# Patient Record
Sex: FEMALE | Race: WHITE | NOT HISPANIC OR LATINO
[De-identification: names, ages, dates, MRNs, and addresses within clinical notes are randomized per-mention and may not be internally consistent; named-entity substitution may affect disease eponyms.]

---

## 2022-11-17 ENCOUNTER — NON-APPOINTMENT (OUTPATIENT)
Age: 72
End: 2022-11-17

## 2022-11-17 ENCOUNTER — APPOINTMENT (OUTPATIENT)
Dept: HEART AND VASCULAR | Facility: CLINIC | Age: 72
End: 2022-11-17

## 2022-11-17 VITALS
WEIGHT: 257 LBS | HEART RATE: 90 BPM | RESPIRATION RATE: 16 BRPM | HEIGHT: 66 IN | DIASTOLIC BLOOD PRESSURE: 68 MMHG | BODY MASS INDEX: 41.3 KG/M2 | SYSTOLIC BLOOD PRESSURE: 150 MMHG | TEMPERATURE: 98 F | OXYGEN SATURATION: 98 %

## 2022-11-17 DIAGNOSIS — Z87.09 PERSONAL HISTORY OF OTHER DISEASES OF THE RESPIRATORY SYSTEM: ICD-10-CM

## 2022-11-17 DIAGNOSIS — Z78.9 OTHER SPECIFIED HEALTH STATUS: ICD-10-CM

## 2022-11-17 DIAGNOSIS — Z82.49 FAMILY HISTORY OF ISCHEMIC HEART DISEASE AND OTHER DISEASES OF THE CIRCULATORY SYSTEM: ICD-10-CM

## 2022-11-17 DIAGNOSIS — Z85.89 PERSONAL HISTORY OF MALIGNANT NEOPLASM OF OTHER ORGANS AND SYSTEMS: ICD-10-CM

## 2022-11-17 DIAGNOSIS — Z86.19 PERSONAL HISTORY OF OTHER INFECTIOUS AND PARASITIC DISEASES: ICD-10-CM

## 2022-11-17 DIAGNOSIS — Z86.39 PERSONAL HISTORY OF OTHER ENDOCRINE, NUTRITIONAL AND METABOLIC DISEASE: ICD-10-CM

## 2022-11-17 DIAGNOSIS — Z72.3 LACK OF PHYSICAL EXERCISE: ICD-10-CM

## 2022-11-17 DIAGNOSIS — Z82.3 FAMILY HISTORY OF STROKE: ICD-10-CM

## 2022-11-17 DIAGNOSIS — Z86.010 PERSONAL HISTORY OF COLONIC POLYPS: ICD-10-CM

## 2022-11-17 DIAGNOSIS — Z85.820 PERSONAL HISTORY OF MALIGNANT MELANOMA OF SKIN: ICD-10-CM

## 2022-11-17 DIAGNOSIS — N20.0 CALCULUS OF KIDNEY: ICD-10-CM

## 2022-11-17 DIAGNOSIS — Z87.891 PERSONAL HISTORY OF NICOTINE DEPENDENCE: ICD-10-CM

## 2022-11-17 PROCEDURE — 99205 OFFICE O/P NEW HI 60 MIN: CPT

## 2022-11-17 PROCEDURE — 93000 ELECTROCARDIOGRAM COMPLETE: CPT

## 2022-11-17 RX ORDER — SIMVASTATIN 40 MG/1
40 TABLET, FILM COATED ORAL DAILY
Refills: 0 | Status: ACTIVE | COMMUNITY

## 2022-11-17 RX ORDER — LEVOTHYROXINE SODIUM 100 UG/1
100 CAPSULE ORAL DAILY
Refills: 0 | Status: ACTIVE | COMMUNITY

## 2022-11-17 NOTE — HISTORY OF PRESENT ILLNESS
[FreeTextEntry1] : Jerri Rey is a 71 yo female who presents for CV evaluation.\par \par She denies cp, sob, pnd, orthopnea, edema, palp, or loc.\par \par She is active.  She is compliant with meds.\par \par ECG today reveals NSR, ST T changes.\par \par Clinical hx reviewed in detail.\par \par Recommendations:\par 1. exercise\par 2. continue current meds\par 3. EXSE\par 4. CPET\par 5. carotid duplex

## 2022-11-17 NOTE — DISCUSSION/SUMMARY
[ECG Normal Variant] : ECG normal variant [Myocardial Ischemia] : myocardial ischemia [Hyperlipidemia] : hyperlipidemia [Stable] : stable [None] : There are no changes in medication management [Diet Modification] : diet modification [Exercise] : exercise [Weight Loss] : weight loss [Patient] : the patient [FreeTextEntry1] : DM \par \par GIOVANNI [EKG obtained to assist in diagnosis and management of assessed problem(s)] : EKG obtained to assist in diagnosis and management of assessed problem(s)

## 2022-11-17 NOTE — REASON FOR VISIT
[Arrhythmia/ECG Abnorrmalities] : arrhythmia/ECG abnormalities [Hyperlipidemia] : hyperlipidemia [FreeTextEntry3] : Jamie Feng

## 2022-12-19 ENCOUNTER — APPOINTMENT (OUTPATIENT)
Dept: HEART AND VASCULAR | Facility: CLINIC | Age: 72
End: 2022-12-19

## 2022-12-19 VITALS
BODY MASS INDEX: 40.02 KG/M2 | DIASTOLIC BLOOD PRESSURE: 60 MMHG | SYSTOLIC BLOOD PRESSURE: 154 MMHG | HEART RATE: 77 BPM | HEIGHT: 66 IN | WEIGHT: 249 LBS | OXYGEN SATURATION: 96 %

## 2022-12-19 PROCEDURE — 93880 EXTRACRANIAL BILAT STUDY: CPT

## 2022-12-19 PROCEDURE — 93320 DOPPLER ECHO COMPLETE: CPT

## 2022-12-19 PROCEDURE — 93325 DOPPLER ECHO COLOR FLOW MAPG: CPT

## 2022-12-19 PROCEDURE — 93351 STRESS TTE COMPLETE: CPT

## 2022-12-21 ENCOUNTER — NON-APPOINTMENT (OUTPATIENT)
Age: 72
End: 2022-12-21

## 2022-12-21 ENCOUNTER — APPOINTMENT (OUTPATIENT)
Dept: VASCULAR SURGERY | Facility: CLINIC | Age: 72
End: 2022-12-21

## 2022-12-21 VITALS — HEIGHT: 66 IN | WEIGHT: 249 LBS | BODY MASS INDEX: 40.02 KG/M2

## 2022-12-21 PROCEDURE — 99204 OFFICE O/P NEW MOD 45 MIN: CPT

## 2022-12-21 NOTE — HISTORY OF PRESENT ILLNESS
[FreeTextEntry1] : 71 yo female referred by Dr. Leigh for a high grade asymptomatic carotid stenosis. The patient denies amaurosis fugax, motor or sensory deficits. She is a former smoker quit 48 years ago. She currently takes simvastatin and denies being on any antiplatelets or blood thinners.\par

## 2022-12-21 NOTE — REVIEW OF SYSTEMS
[As Noted in HPI] : as noted in HPI [Fever] : no fever [Chills] : no chills [Lower Ext Edema] : no lower extremity edema

## 2022-12-21 NOTE — CONSULT LETTER
[Dear  ___] : Dear  [unfilled], [Consult Letter:] : I had the pleasure of evaluating your patient, [unfilled]. [Please see my note below.] : Please see my note below. [Consult Closing:] : Thank you very much for allowing me to participate in the care of this patient.  If you have any questions, please do not hesitate to contact me. [Sincerely,] : Sincerely, [FreeTextEntry2] : Jamie Not [FreeTextEntry3] : Claudio Moreno MD, RPVI\par Chief, Vascular Surgery\par

## 2022-12-21 NOTE — ASSESSMENT
[FreeTextEntry1] : 73 yo female with a high grade asymptomatic carotid stenosis.  Her ICA : CCA ratio is 6. I explained to the patient that asymptomatic carotid stenosis is treated when the stenosis is > 80%. An ICA : CCA ration of 6 corresponds to a stenosis > 80%. I recommended that she undergo a CTA to confirm the degree of the stenosis. I also recommended that she begin to take an Aspirin 81 mg daily. \par \par She will be seen on 1/6 for follow up .

## 2022-12-21 NOTE — PHYSICAL EXAM
[Carotid Bruits] : carotid bruit  [Normal Breath Sounds] : Normal breath sounds [Normal Rate and Rhythm] : normal rate and rhythm [2+] : left 2+ [No Rash or Lesion] : No rash or lesion [Alert] : alert [Oriented to Person] : oriented to person [Oriented to Place] : oriented to place [Oriented to Time] : oriented to time [de-identified] : Awake and Alert [de-identified] : No gross motor or sensory deficits  [de-identified] : Appropriate

## 2023-01-06 ENCOUNTER — APPOINTMENT (OUTPATIENT)
Dept: VASCULAR SURGERY | Facility: CLINIC | Age: 73
End: 2023-01-06
Payer: MEDICARE

## 2023-01-06 VITALS
SYSTOLIC BLOOD PRESSURE: 144 MMHG | HEART RATE: 82 BPM | HEIGHT: 66 IN | OXYGEN SATURATION: 98 % | RESPIRATION RATE: 16 BRPM | WEIGHT: 250 LBS | DIASTOLIC BLOOD PRESSURE: 78 MMHG | BODY MASS INDEX: 40.18 KG/M2 | TEMPERATURE: 97.8 F

## 2023-01-06 PROCEDURE — 99215 OFFICE O/P EST HI 40 MIN: CPT

## 2023-01-06 NOTE — CONSULT LETTER
[Dear  ___] : Dear  [unfilled], [Consult Letter:] : I had the pleasure of evaluating your patient, [unfilled]. [Please see my note below.] : Please see my note below. [Consult Closing:] : Thank you very much for allowing me to participate in the care of this patient.  If you have any questions, please do not hesitate to contact me. [Sincerely,] : Sincerely, [FreeTextEntry2] : Jamie Feng [FreeTextEntry3] : Claudio Moreno MD, RPVI\par Chief, Vascular Surgery\par

## 2023-01-06 NOTE — HISTORY OF PRESENT ILLNESS
[FreeTextEntry1] : 73 yo female referred by Dr. Leigh for a high grade asymptomatic carotid stenosis. The patient denies amaurosis fugax, motor or sensory deficits. She is a former smoker quit 48 years ago. She currently takes simvastatin and denies being on any antiplatelets or blood thinners.\par  [de-identified] : 72-year-old female with a high-grade right carotid stenosis returns in follow-up.  The patient continues to deny amaurosis fugax, motor or sensory deficits.  The patient continues to take aspirin and a statin as prescribed.  She underwent a CTA and is here to discuss the results and additional treatment options.

## 2023-01-06 NOTE — ASSESSMENT
[FreeTextEntry1] : 73 yo female with a high grade right asymptomatic carotid stenosis.  Her ICA : CCA ratio is 6. She underwent a CTA which demonstrated a 90% right carotid stenosis corroborating the duplex findings.  I recommended that the patient undergo a right carotid endarterectomy.  The risks and benefits including infection bleeding stroke were discussed with the patient in detail.  The patient is planning on going to [place] for the next several months and is uncertain on when she would like to proceed.  I recommended that she proceed with the surgery before she goes to [place].  She would like to discuss my recommendations with her .  She will contact me if she would like to proceed.\par \par \par \par 45 min

## 2023-01-06 NOTE — PHYSICAL EXAM
[Normal Breath Sounds] : Normal breath sounds [Normal Rate and Rhythm] : normal rate and rhythm [2+] : left 2+ [No Rash or Lesion] : No rash or lesion [Alert] : alert [Oriented to Person] : oriented to person [Oriented to Place] : oriented to place [Oriented to Time] : oriented to time [de-identified] : Awake and Alert [de-identified] : No gross motor or sensory deficits  [de-identified] : Appropriate

## 2023-01-06 NOTE — REVIEW OF SYSTEMS
[Fever] : no fever [Chills] : no chills [Lower Ext Edema] : no lower extremity edema [As Noted in HPI] : as noted in HPI

## 2023-02-23 ENCOUNTER — APPOINTMENT (OUTPATIENT)
Dept: HEART AND VASCULAR | Facility: CLINIC | Age: 73
End: 2023-02-23
Payer: MEDICARE

## 2023-02-23 VITALS
TEMPERATURE: 97.6 F | WEIGHT: 253 LBS | HEIGHT: 66 IN | OXYGEN SATURATION: 98 % | DIASTOLIC BLOOD PRESSURE: 76 MMHG | BODY MASS INDEX: 40.66 KG/M2 | RESPIRATION RATE: 17 BRPM | HEART RATE: 78 BPM | SYSTOLIC BLOOD PRESSURE: 150 MMHG

## 2023-02-23 DIAGNOSIS — Z00.00 ENCOUNTER FOR GENERAL ADULT MEDICAL EXAMINATION W/OUT ABNORMAL FINDINGS: ICD-10-CM

## 2023-02-23 PROCEDURE — 99215 OFFICE O/P EST HI 40 MIN: CPT

## 2023-02-24 LAB
ALBUMIN SERPL ELPH-MCNC: 4.6 G/DL
ALP BLD-CCNC: 102 U/L
ALT SERPL-CCNC: 27 U/L
ANION GAP SERPL CALC-SCNC: 17 MMOL/L
AST SERPL-CCNC: 17 U/L
BILIRUB SERPL-MCNC: 0.6 MG/DL
BUN SERPL-MCNC: 15 MG/DL
CALCIUM SERPL-MCNC: 10.8 MG/DL
CHLORIDE SERPL-SCNC: 100 MMOL/L
CO2 SERPL-SCNC: 23 MMOL/L
CREAT SERPL-MCNC: 0.61 MG/DL
EGFR: 93 ML/MIN/1.73M2
GLUCOSE SERPL-MCNC: 242 MG/DL
POTASSIUM SERPL-SCNC: 5 MMOL/L
PROT SERPL-MCNC: 7.5 G/DL
SODIUM SERPL-SCNC: 140 MMOL/L

## 2023-02-24 RX ORDER — ASPIRIN 81 MG
81 TABLET, DELAYED RELEASE (ENTERIC COATED) ORAL
Refills: 0 | Status: ACTIVE | COMMUNITY

## 2023-02-24 RX ORDER — UBIDECARENONE/VIT E ACET 100MG-5
CAPSULE ORAL
Refills: 0 | Status: ACTIVE | COMMUNITY

## 2023-02-24 RX ORDER — TURMERIC ROOT EXTRACT 500 MG
TABLET ORAL
Refills: 0 | Status: ACTIVE | COMMUNITY

## 2023-02-24 RX ORDER — VITAMIN E ACID SUCCINATE 268 MG
TABLET ORAL
Refills: 0 | Status: ACTIVE | COMMUNITY

## 2023-02-24 RX ORDER — GLIPIZIDE 10 MG/1
10 TABLET ORAL DAILY
Refills: 0 | Status: ACTIVE | COMMUNITY

## 2023-02-24 RX ORDER — SAW PALMETTO 160 MG
500 CAPSULE ORAL
Refills: 0 | Status: ACTIVE | COMMUNITY

## 2023-02-24 RX ORDER — UBIDECARENONE 200 MG
CAPSULE ORAL
Refills: 0 | Status: ACTIVE | COMMUNITY

## 2023-02-26 ENCOUNTER — RESULT REVIEW (OUTPATIENT)
Age: 73
End: 2023-02-26

## 2023-02-26 NOTE — DISCUSSION/SUMMARY
[ECG Normal Variant] : ECG normal variant [Myocardial Ischemia] : myocardial ischemia [Sodium Restriction] : sodium restriction [Possible Cardiac Ischemia (Intermd Prob)] : possible cardiac ischemia (intermediate probability) [Dietary Modification] : dietary modification [Exercise Regimen] : an exercise regimen [Weight Reduction] : weight reduction [Hyperlipidemia] : hyperlipidemia [Stable] : stable [Diet Modification] : diet modification [Exercise] : exercise [Weight Loss] : weight loss [Peripheral Vascular Disease] : peripheral vascular disease [Deteriorating] : deteriorating [None] : There are no changes in medication management [Smoking Cessation] : smoking cessation [Exercise Rehab] : exercise rehabilitation [Patient] : the patient [de-identified] : severe CAROLYN lesion; mod P2 lesion by CTA 12/2022; abnl carotid dupelx 12/2022 [de-identified] : abnl stress ECG 12/2022 [FreeTextEntry1] : DM \par \par GIOVANNI

## 2023-02-26 NOTE — REASON FOR VISIT
[Arrhythmia/ECG Abnorrmalities] : arrhythmia/ECG abnormalities [Hyperlipidemia] : hyperlipidemia [Coronary Artery Disease] : coronary artery disease [Carotid, Aortic and Peripheral Vascular Disease] : carotid, aortic and peripheral vascular disease [Spouse] : spouse [FreeTextEntry3] : Jamie Feng

## 2023-02-26 NOTE — HISTORY OF PRESENT ILLNESS
[FreeTextEntry1] : Jerri Rey returns for follow up.   \par \par She denies cp, sob, pnd, orthopnea, edema, palp, or loc.\par \par She is active.  She is compliant with meds.\par \par Carotid Duplex 12/2022: severe CAROLYN lesion\par EXSE 12/2022: nl lv sys fxn; nl richard fxn; LVH; 3:00 min Andrea; pos ECG; SVT, PVC; no ischemia by WM\par CTA 12/2022: severe CAROLYN lesion; mod lesion in P2 segment of post cerebral artery; poss meningioma\par \par Clinical hx and results reviewed in detail.\par \par Recommendations:\par 1. exercise\par 2. continue current meds\par 3. Vasc surgery eval/plan noted\par 4. coronary CTA as part of pre-op eval\par

## 2023-02-26 NOTE — PHYSICAL EXAM
[Well Developed] : well developed [Well Nourished] : well nourished [No Acute Distress] : no acute distress [Normal Conjunctiva] : normal conjunctiva [Normal Venous Pressure] : normal venous pressure [No Carotid Bruit] : no carotid bruit [Normal S1, S2] : normal S1, S2 [No Rub] : no rub [No Murmur] : no murmur [No Gallop] : no gallop [Clear Lung Fields] : clear lung fields [Good Air Entry] : good air entry [No Respiratory Distress] : no respiratory distress  [Soft] : abdomen soft [Non Tender] : non-tender [No Masses/organomegaly] : no masses/organomegaly [Normal Bowel Sounds] : normal bowel sounds [Normal Gait] : normal gait [No Edema] : no edema [No Cyanosis] : no cyanosis [No Clubbing] : no clubbing [No Rash] : no rash [No Varicosities] : no varicosities [No Skin Lesions] : no skin lesions [Moves all extremities] : moves all extremities [No Focal Deficits] : no focal deficits [Normal Speech] : normal speech [Alert and Oriented] : alert and oriented [Normal memory] : normal memory

## 2023-03-01 ENCOUNTER — NON-APPOINTMENT (OUTPATIENT)
Age: 73
End: 2023-03-01

## 2023-03-01 ENCOUNTER — APPOINTMENT (OUTPATIENT)
Dept: HEART AND VASCULAR | Facility: CLINIC | Age: 73
End: 2023-03-01

## 2023-03-01 ENCOUNTER — RESULT REVIEW (OUTPATIENT)
Age: 73
End: 2023-03-01

## 2023-03-02 ENCOUNTER — APPOINTMENT (OUTPATIENT)
Dept: VASCULAR SURGERY | Facility: HOSPITAL | Age: 73
End: 2023-03-02

## 2023-03-06 ENCOUNTER — RESULT REVIEW (OUTPATIENT)
Age: 73
End: 2023-03-06

## 2023-03-06 ENCOUNTER — NON-APPOINTMENT (OUTPATIENT)
Age: 73
End: 2023-03-06

## 2023-03-07 ENCOUNTER — APPOINTMENT (OUTPATIENT)
Dept: VASCULAR SURGERY | Facility: HOSPITAL | Age: 73
End: 2023-03-07

## 2023-03-08 ENCOUNTER — TRANSCRIPTION ENCOUNTER (OUTPATIENT)
Age: 73
End: 2023-03-08

## 2023-03-09 ENCOUNTER — TRANSCRIPTION ENCOUNTER (OUTPATIENT)
Age: 73
End: 2023-03-09

## 2023-03-16 ENCOUNTER — APPOINTMENT (OUTPATIENT)
Dept: HEART AND VASCULAR | Facility: CLINIC | Age: 73
End: 2023-03-16
Payer: MEDICARE

## 2023-03-16 VITALS
DIASTOLIC BLOOD PRESSURE: 76 MMHG | BODY MASS INDEX: 39.37 KG/M2 | HEIGHT: 66 IN | RESPIRATION RATE: 16 BRPM | OXYGEN SATURATION: 97 % | TEMPERATURE: 97.6 F | WEIGHT: 245 LBS | HEART RATE: 103 BPM | SYSTOLIC BLOOD PRESSURE: 130 MMHG

## 2023-03-16 DIAGNOSIS — R94.39 ABNORMAL RESULT OF OTHER CARDIOVASCULAR FUNCTION STUDY: ICD-10-CM

## 2023-03-16 PROCEDURE — 99215 OFFICE O/P EST HI 40 MIN: CPT

## 2023-03-16 RX ORDER — METOPROLOL SUCCINATE 100 MG/1
100 TABLET, EXTENDED RELEASE ORAL
Qty: 2 | Refills: 0 | Status: DISCONTINUED | COMMUNITY
Start: 2023-02-24 | End: 2023-03-16

## 2023-03-16 NOTE — DISCUSSION/SUMMARY
[ECG Normal Variant] : ECG normal variant [Myocardial Ischemia] : myocardial ischemia [Sodium Restriction] : sodium restriction [Coronary Artery Disease] : coronary artery disease [Possible Cardiac Ischemia (Intermd Prob)] : possible cardiac ischemia (intermediate probability) [Dietary Modification] : dietary modification [Exercise Regimen] : an exercise regimen [Weight Reduction] : weight reduction [Hyperlipidemia] : hyperlipidemia [Stable] : stable [Diet Modification] : diet modification [Exercise] : exercise [Weight Loss] : weight loss [Peripheral Vascular Disease] : peripheral vascular disease [Deteriorating] : deteriorating [Smoking Cessation] : smoking cessation [None] : There are no changes in medication management [Exercise Rehab] : exercise rehabilitation [Patient] : the patient [de-identified] : nonobstructive CAD by cardiac cath 3/2023; abnl coronary CTA 2/2023; abnl stress ECG 12/2022 [de-identified] : severe CAROLYN lesion; mod P2 lesion by CTA 12/2022; abnl carotid dupelx 12/2022 [FreeTextEntry1] : DM \par \par GIOVANNI

## 2023-03-16 NOTE — HISTORY OF PRESENT ILLNESS
[FreeTextEntry1] : Jerri Rey returns for follow up.   \par \par She denies cp, sob, pnd, orthopnea, edema, palp, or loc.\par \par She is active.  She is compliant with meds.\par \par Carotid Duplex 12/2022: severe CAROLYN lesion\par EXSE 12/2022: nl lv sys fxn; nl richard fxn; LVH; 3:00 min Andrea; pos ECG; SVT, PVC; no ischemia by WM\par CTA 12/2022: severe CAROLYN lesion; mod lesion in P2 segment of post cerebral artery; poss meningioma\par coronary CTA 2/2023: mod mid LAD lesion (CT FFR 0.88-0.77), poss severe D1 lesion, mild CX (CT FFR 0.93), and mod RCA lesion (CT FFR 0.86)\par Cardiac Cath 3/2023: non obstructive CAD\par \par Recovery has been unremarkable.   R CEA surgery has been delayed due to UTI - being treated now.\par \par Clinical hx and results reviewed in detail.\par \par Recommendations:\par 1. exercise\par 2. continue current meds\par 3. repeat UA today\par 4. Vasc surgery follow up - ok to proceed from CV perspective\par 5. Endocrine referral \par 6. f/u with me post R CEA\par \par \par

## 2023-03-16 NOTE — REASON FOR VISIT
[Arrhythmia/ECG Abnorrmalities] : arrhythmia/ECG abnormalities [Hyperlipidemia] : hyperlipidemia [Coronary Artery Disease] : coronary artery disease [Carotid, Aortic and Peripheral Vascular Disease] : carotid, aortic and peripheral vascular disease [FreeTextEntry3] : Jamie Feng

## 2023-03-17 ENCOUNTER — NON-APPOINTMENT (OUTPATIENT)
Age: 73
End: 2023-03-17

## 2023-03-17 LAB
APPEARANCE: CLEAR
BACTERIA: ABNORMAL
BILIRUBIN URINE: NEGATIVE
BLOOD URINE: NEGATIVE
COLOR: YELLOW
GLUCOSE QUALITATIVE U: ABNORMAL
HYALINE CASTS: 0 /LPF
KETONES URINE: ABNORMAL
LEUKOCYTE ESTERASE URINE: NEGATIVE
MICROSCOPIC-UA: NORMAL
NITRITE URINE: NEGATIVE
PH URINE: 6
PROTEIN URINE: NORMAL
RED BLOOD CELLS URINE: 4 /HPF
SPECIFIC GRAVITY URINE: 1.04
SQUAMOUS EPITHELIAL CELLS: 6 /HPF
URIC ACID CRYSTALS: ABNORMAL
UROBILINOGEN URINE: NORMAL
WHITE BLOOD CELLS URINE: 2 /HPF

## 2023-03-21 ENCOUNTER — APPOINTMENT (OUTPATIENT)
Dept: ENDOCRINOLOGY | Facility: CLINIC | Age: 73
End: 2023-03-21
Payer: MEDICARE

## 2023-03-21 VITALS
WEIGHT: 245 LBS | BODY MASS INDEX: 39.37 KG/M2 | HEART RATE: 75 BPM | OXYGEN SATURATION: 98 % | SYSTOLIC BLOOD PRESSURE: 130 MMHG | HEIGHT: 66 IN | DIASTOLIC BLOOD PRESSURE: 80 MMHG

## 2023-03-21 LAB — GLUCOSE BLDC GLUCOMTR-MCNC: 139

## 2023-03-21 PROCEDURE — 82962 GLUCOSE BLOOD TEST: CPT

## 2023-03-21 PROCEDURE — G0444 DEPRESSION SCREEN ANNUAL: CPT

## 2023-03-21 PROCEDURE — 99205 OFFICE O/P NEW HI 60 MIN: CPT | Mod: 25

## 2023-03-21 RX ORDER — ZINC 25 MG
25 TABLET ORAL
Refills: 0 | Status: DISCONTINUED | COMMUNITY
End: 2023-03-21

## 2023-03-23 RX ORDER — ORAL SEMAGLUTIDE 14 MG/1
14 TABLET ORAL
Qty: 30 | Refills: 0 | Status: DISCONTINUED | COMMUNITY
Start: 2022-08-19 | End: 2023-03-23

## 2023-03-23 NOTE — ASSESSMENT
[0] : 2) Feeling down, depressed, or hopeless: Not at all (0) [PHQ-2 Negative - No further assessment needed] : PHQ-2 Negative - No further assessment needed [[id number]] : 0

## 2023-03-24 ENCOUNTER — NON-APPOINTMENT (OUTPATIENT)
Age: 73
End: 2023-03-24

## 2023-03-31 RX ORDER — ORAL SEMAGLUTIDE 14 MG/1
14 TABLET ORAL
Qty: 90 | Refills: 0 | Status: ACTIVE | COMMUNITY

## 2023-04-10 ENCOUNTER — APPOINTMENT (OUTPATIENT)
Dept: ENDOCRINOLOGY | Facility: CLINIC | Age: 73
End: 2023-04-10
Payer: MEDICARE

## 2023-04-10 PROCEDURE — 95251 CONT GLUC MNTR ANALYSIS I&R: CPT

## 2023-04-10 PROCEDURE — 95250 CONT GLUC MNTR PHYS/QHP EQP: CPT

## 2023-04-24 ENCOUNTER — APPOINTMENT (OUTPATIENT)
Dept: HEART AND VASCULAR | Facility: CLINIC | Age: 73
End: 2023-04-24
Payer: MEDICARE

## 2023-04-24 ENCOUNTER — RESULT REVIEW (OUTPATIENT)
Age: 73
End: 2023-04-24

## 2023-04-24 VITALS
WEIGHT: 244 LBS | TEMPERATURE: 97.6 F | SYSTOLIC BLOOD PRESSURE: 130 MMHG | BODY MASS INDEX: 39.21 KG/M2 | RESPIRATION RATE: 16 BRPM | OXYGEN SATURATION: 96 % | HEART RATE: 76 BPM | DIASTOLIC BLOOD PRESSURE: 66 MMHG | HEIGHT: 66 IN

## 2023-04-24 PROCEDURE — 99215 OFFICE O/P EST HI 40 MIN: CPT

## 2023-04-24 RX ORDER — TIRZEPATIDE 5 MG/.5ML
5 INJECTION, SOLUTION SUBCUTANEOUS
Qty: 1 | Refills: 0 | Status: DISCONTINUED | COMMUNITY
Start: 2023-03-21 | End: 2023-04-24

## 2023-04-24 NOTE — HISTORY OF PRESENT ILLNESS
[FreeTextEntry1] : Jerri Rey returns for follow up.   \par \par She denies cp, sob, pnd, orthopnea, edema, palp, or loc.\par \par She is active.  She is compliant with meds.\par \par Carotid Duplex 12/2022: severe CAROLYN lesion\par EXSE 12/2022: nl lv sys fxn; nl richard fxn; LVH; 3:00 min Andrea; pos ECG; SVT, PVC; no ischemia by WM\par CTA 12/2022: severe CAROLYN lesion; mod lesion in P2 segment of post cerebral artery; poss meningioma\par coronary CTA 2/2023: mod mid LAD lesion (CT FFR 0.88-0.77), poss severe D1 lesion, mild CX (CT FFR 0.93), and mod RCA lesion (CT FFR 0.86)\par Cardiac Cath 3/2023: non obstructive CAD\par \par R CEA surgery had been delayed due to UTI - now resolved.  \par \par Clinical hx reviewed in detail.\par \par Recommendations:\par 1. exercise\par 2. continue current meds\par 3. Endocrine eval noted\par 4. Vasc surgery follow up - ok to proceed from CV perspective\par 5. f/u with me post R CEA\par \par \par

## 2023-04-24 NOTE — REASON FOR VISIT
[Hyperlipidemia] : hyperlipidemia [Arrhythmia/ECG Abnorrmalities] : arrhythmia/ECG abnormalities [Coronary Artery Disease] : coronary artery disease [Carotid, Aortic and Peripheral Vascular Disease] : carotid, aortic and peripheral vascular disease [FreeTextEntry3] : Jamie Feng

## 2023-04-24 NOTE — DISCUSSION/SUMMARY
[ECG Normal Variant] : ECG normal variant [Myocardial Ischemia] : myocardial ischemia [Sodium Restriction] : sodium restriction [Coronary Artery Disease] : coronary artery disease [Possible Cardiac Ischemia (Intermd Prob)] : possible cardiac ischemia (intermediate probability) [Dietary Modification] : dietary modification [Exercise Regimen] : an exercise regimen [Weight Reduction] : weight reduction [Hyperlipidemia] : hyperlipidemia [Diet Modification] : diet modification [Exercise] : exercise [Weight Loss] : weight loss [Peripheral Vascular Disease] : peripheral vascular disease [Stable] : stable [None] : There are no changes in medication management [Smoking Cessation] : smoking cessation [Exercise Rehab] : exercise rehabilitation [Patient] : the patient [de-identified] : nonobstructive CAD by cardiac cath 3/2023; abnl coronary CTA 2/2023; abnl stress ECG 12/2022 [de-identified] : severe CAROLYN lesion; mod P2 lesion by CTA 12/2022; abnl carotid dupelx 12/2022 [FreeTextEntry1] : DM \par \par GIOVANNI

## 2023-05-01 ENCOUNTER — RESULT REVIEW (OUTPATIENT)
Age: 73
End: 2023-05-01

## 2023-05-02 ENCOUNTER — APPOINTMENT (OUTPATIENT)
Dept: VASCULAR SURGERY | Facility: HOSPITAL | Age: 73
End: 2023-05-02

## 2023-05-03 ENCOUNTER — TRANSCRIPTION ENCOUNTER (OUTPATIENT)
Age: 73
End: 2023-05-03

## 2023-05-04 ENCOUNTER — TRANSCRIPTION ENCOUNTER (OUTPATIENT)
Age: 73
End: 2023-05-04

## 2023-05-09 ENCOUNTER — APPOINTMENT (OUTPATIENT)
Dept: ENDOCRINOLOGY | Facility: CLINIC | Age: 73
End: 2023-05-09
Payer: MEDICARE

## 2023-05-09 VITALS
HEIGHT: 66 IN | BODY MASS INDEX: 38.57 KG/M2 | WEIGHT: 240 LBS | SYSTOLIC BLOOD PRESSURE: 118 MMHG | OXYGEN SATURATION: 97 % | DIASTOLIC BLOOD PRESSURE: 72 MMHG | HEART RATE: 74 BPM

## 2023-05-09 LAB — GLUCOSE BLDC GLUCOMTR-MCNC: 155

## 2023-05-09 PROCEDURE — 82962 GLUCOSE BLOOD TEST: CPT

## 2023-05-09 PROCEDURE — 99215 OFFICE O/P EST HI 40 MIN: CPT | Mod: 25

## 2023-05-09 NOTE — HISTORY OF PRESENT ILLNESS
[FreeTextEntry1] : 73-year-old lady referred for type 2 diabetes which is uncontrolled patient is supposed to have carotid endarterectomy procedure could not be process performed due to poorly controlled diabetes.  She was diagnosed with type 2 diabetes and at the age of 67 years current regimen includes metformin glipizide 10 mg daily Rybelsus 14 mg once a day and Jardiance 25 mg which was recently started after she was recovering from UTI infection.  She tries to walk and follow her regimen.  She checks blood sugar every other day.  So far her diabetes managed by her primary care physician she is never been hospitalized for diabetes.  She tries to eat regularly and tries her best to eat healthy meals.\par \par 05/09/2023- FOLLOW UP\wellington Zavala presents for type 2 diabetes follow-up.  She had surgery couple of days back doing well.  She had questions about statin use as well as metformin use which were addressed and what drugs were encouraged.  Currently on Jardiance 25 mg daily Rybelsus 40 mg daily glipizide 10 mg daily and metformin 500 twice daily.  Most recent A1c was 6.6% has done on preoperative labs.  I discussed her carissa download as well.  Patient did report some UTI symptoms couple days back and took some levofloxacin which was left from her prior prescription I encouraged her to complete the course and let her primary care physician know.  If she gets recurrent UTI might need discontinuance of Jardiance.  Overall feels her symptoms are much better.  Once she finishes Rybelsus I will transition her to Ozempic 0.5 once a week to uptitrate to Ozempic 1 mg weekly.  Follow-up in 3 to 4 months.

## 2023-05-09 NOTE — ASSESSMENT
[0] : 2) Feeling down, depressed, or hopeless: Not at all (0) [PHQ-2 Negative - No further assessment needed] : PHQ-2 Negative - No further assessment needed [[id number]] : 0

## 2023-05-19 ENCOUNTER — APPOINTMENT (OUTPATIENT)
Dept: VASCULAR SURGERY | Facility: CLINIC | Age: 73
End: 2023-05-19
Payer: MEDICARE

## 2023-05-19 VITALS
HEART RATE: 82 BPM | WEIGHT: 240 LBS | BODY MASS INDEX: 38.57 KG/M2 | SYSTOLIC BLOOD PRESSURE: 120 MMHG | RESPIRATION RATE: 16 BRPM | HEIGHT: 66 IN | DIASTOLIC BLOOD PRESSURE: 60 MMHG | OXYGEN SATURATION: 98 %

## 2023-05-19 DIAGNOSIS — I65.21 OCCLUSION AND STENOSIS OF RIGHT CAROTID ARTERY: ICD-10-CM

## 2023-05-19 PROCEDURE — 99024 POSTOP FOLLOW-UP VISIT: CPT

## 2023-05-20 NOTE — REVIEW OF SYSTEMS
[Fever] : no fever [Chills] : no chills [As Noted in HPI] : as noted in HPI [FreeTextEntry3] : n [FreeTextEntry4] : mild neck pain

## 2023-05-20 NOTE — DISCUSSION/SUMMARY
[FreeTextEntry1] : 73-year-old female status post a right carotid endarterectomy for a high-grade asymptomatic stenosis.  The patient is doing well postoperatively.  Her incision is healing appropriately.  She has no gross motor or sensory deficits and her tongue is midline.  I recommended that she slowly return to her preoperative activities.  She will follow-up in 1 month for a post operative carotid duplex.

## 2023-05-20 NOTE — PHYSICAL EXAM
[de-identified] : Awake and Alert [de-identified] : Incision healing appropriately [de-identified] : No gross motor or sensory deficits. Tongue midline [de-identified] : Appropriate

## 2023-05-20 NOTE — REASON FOR VISIT
[de-identified] : s/p right carotid endarterecomt [de-identified] : 73-year-old female returns in follow-up.  She is status post a right carotid endarterectomy for high-grade asymptomatic stenosis.  The patient reports that she is doing well.  She denies fever or chills.  She reports that her glucose remains well controlled.  She is taking aspirin as prescribed and continues to take a statin.

## 2023-06-21 RX ORDER — EMPAGLIFLOZIN 25 MG/1
25 TABLET, FILM COATED ORAL DAILY
Qty: 90 | Refills: 1 | Status: DISCONTINUED | COMMUNITY
Start: 1900-01-01 | End: 2023-06-21

## 2023-06-21 RX ORDER — SEMAGLUTIDE 0.68 MG/ML
2 INJECTION, SOLUTION SUBCUTANEOUS
Qty: 3 | Refills: 0 | Status: DISCONTINUED | COMMUNITY
Start: 2023-05-09 | End: 2023-06-21

## 2023-06-26 ENCOUNTER — APPOINTMENT (OUTPATIENT)
Dept: HEART AND VASCULAR | Facility: CLINIC | Age: 73
End: 2023-06-26
Payer: MEDICARE

## 2023-06-26 ENCOUNTER — APPOINTMENT (OUTPATIENT)
Dept: ENDOCRINOLOGY | Facility: CLINIC | Age: 73
End: 2023-06-26
Payer: MEDICARE

## 2023-06-26 ENCOUNTER — RESULT CHARGE (OUTPATIENT)
Age: 73
End: 2023-06-26

## 2023-06-26 VITALS
WEIGHT: 242 LBS | HEART RATE: 76 BPM | HEIGHT: 66 IN | SYSTOLIC BLOOD PRESSURE: 110 MMHG | OXYGEN SATURATION: 97 % | BODY MASS INDEX: 38.89 KG/M2 | DIASTOLIC BLOOD PRESSURE: 70 MMHG

## 2023-06-26 VITALS
SYSTOLIC BLOOD PRESSURE: 130 MMHG | DIASTOLIC BLOOD PRESSURE: 70 MMHG | OXYGEN SATURATION: 97 % | HEART RATE: 73 BPM | HEIGHT: 66 IN | WEIGHT: 242 LBS | BODY MASS INDEX: 38.89 KG/M2 | RESPIRATION RATE: 17 BRPM | TEMPERATURE: 96 F

## 2023-06-26 DIAGNOSIS — E03.9 HYPOTHYROIDISM, UNSPECIFIED: ICD-10-CM

## 2023-06-26 LAB — GLUCOSE BLDC GLUCOMTR-MCNC: 152

## 2023-06-26 PROCEDURE — 99214 OFFICE O/P EST MOD 30 MIN: CPT

## 2023-06-26 PROCEDURE — 99215 OFFICE O/P EST HI 40 MIN: CPT

## 2023-06-26 RX ORDER — METFORMIN HYDROCHLORIDE 1000 MG/1
1000 TABLET, COATED ORAL
Qty: 180 | Refills: 3 | Status: ACTIVE | COMMUNITY
Start: 1900-01-01 | End: 1900-01-01

## 2023-06-26 NOTE — HISTORY OF PRESENT ILLNESS
[FreeTextEntry1] : Jerri Rey returns for follow up.   \par \par She is s/p successful R CEA.\par \par She denies cp, sob, pnd, orthopnea, edema, palp, or loc.\par \par She is active.  She is compliant with meds.\par \par Carotid Duplex 12/2022: severe CAROLYN lesion\par EXSE 12/2022: nl lv sys fxn; nl richard fxn; LVH; 3:00 min Andrea; pos ECG; SVT, PVC; no ischemia by WM\par CTA 12/2022: severe CAROLYN lesion; mod lesion in P2 segment of post cerebral artery; poss meningioma\par coronary CTA 2/2023: mod mid LAD lesion (CT FFR 0.88-0.77), poss severe D1 lesion, mild CX (CT FFR 0.93), and mod RCA lesion (CT FFR 0.86)\par Cardiac Cath 3/2023: non obstructive CAD\par \par Clinical hx reviewed in detail.\par \par Recommendations:\par 1. exercise\par 2. continue current meds\par 3. Endocrine eval noted\par 4. f/u in 4 months\par \par

## 2023-06-26 NOTE — ASSESSMENT
[0] : 2) Feeling down, depressed, or hopeless: Not at all (0) [PHQ-2 Negative - No further assessment needed] : PHQ-2 Negative - No further assessment needed [[id number]] : 0

## 2023-06-26 NOTE — DISCUSSION/SUMMARY
[ECG Normal Variant] : ECG normal variant [Myocardial Ischemia] : myocardial ischemia [Sodium Restriction] : sodium restriction [Coronary Artery Disease] : coronary artery disease [Possible Cardiac Ischemia (Intermd Prob)] : possible cardiac ischemia (intermediate probability) [Dietary Modification] : dietary modification [Exercise Regimen] : an exercise regimen [Weight Reduction] : weight reduction [Hyperlipidemia] : hyperlipidemia [Diet Modification] : diet modification [Exercise] : exercise [Weight Loss] : weight loss [Peripheral Vascular Disease] : peripheral vascular disease [Stable] : stable [None] : There are no changes in medication management [Smoking Cessation] : smoking cessation [Exercise Rehab] : exercise rehabilitation [Patient] : the patient [de-identified] : nonobstructive CAD by cardiac cath 3/2023; abnl coronary CTA 2/2023; abnl stress ECG 12/2022 [de-identified] : s/p R CEA 5/2023; severe CAROLYN lesion; mod P2 lesion by CTA 12/2022; abnl carotid dupelx 12/2022 [FreeTextEntry1] : DM \par \par GIOVANNI

## 2023-06-26 NOTE — HISTORY OF PRESENT ILLNESS
[FreeTextEntry1] : 73-year-old lady referred for type 2 diabetes which is uncontrolled patient is supposed to have carotid endarterectomy procedure could not be process performed due to poorly controlled diabetes.  She was diagnosed with type 2 diabetes and at the age of 67 years current regimen includes metformin glipizide 10 mg daily Rybelsus 14 mg once a day and Jardiance 25 mg which was recently started after she was recovering from UTI infection.  She tries to walk and follow her regimen.  She checks blood sugar every other day.  So far her diabetes managed by her primary care physician she is never been hospitalized for diabetes.  She tries to eat regularly and tries her best to eat healthy meals.\par \par 05/09/2023- FOLLOW UP\wellington Zavala presents for type 2 diabetes follow-up.  She had surgery couple of days back doing well.  She had questions about statin use as well as metformin use which were addressed and what drugs were encouraged.  Currently on Jardiance 25 mg daily Rybelsus 40 mg daily glipizide 10 mg daily and metformin 500 twice daily.  Most recent A1c was 6.6% has done on preoperative labs.  I discussed her carissa download as well.  Patient did report some UTI symptoms couple days back and took some levofloxacin which was left from her prior prescription I encouraged her to complete the course and let her primary care physician know.  If she gets recurrent UTI might need discontinuance of Jardiance.  Overall feels her symptoms are much better.  Once she finishes Rybelsus I will transition her to Ozempic 0.5 once a week to uptitrate to Ozempic 1 mg weekly.  Follow-up in 3 to 4 months.\par \par 06/26/2023- FOLLOW UP\wellington Did not start Ozempic currently on Rybelsus 14 metformin 500 twice daily glipizide 10 mg daily Jardiance was stopped due to recurrent UTI and vaginitis.  This is her third episode of bladder infection.  She is currently on nitrofurantoin and fluconazole.  I would advise her to not restart the Jardiance due to recurrent risk.  I would increase metformin to thousand twice daily.  Her most recent A1c has been excellent I anticipate next A1c to spike up a little but as long as A1c stays below 7 would not change any treatment plan.  If A1c starts spiking above 7 might be worthwhile to try injectable semaglutide.  Else would recommend trialing Mounjaro.  All questions and concerns addressed follow-up in 3 months

## 2023-06-30 ENCOUNTER — APPOINTMENT (OUTPATIENT)
Dept: HEART AND VASCULAR | Facility: CLINIC | Age: 73
End: 2023-06-30
Payer: MEDICARE

## 2023-06-30 PROCEDURE — 93880 EXTRACRANIAL BILAT STUDY: CPT

## 2023-09-25 ENCOUNTER — APPOINTMENT (OUTPATIENT)
Dept: ENDOCRINOLOGY | Facility: CLINIC | Age: 73
End: 2023-09-25

## 2023-11-02 ENCOUNTER — APPOINTMENT (OUTPATIENT)
Dept: HEART AND VASCULAR | Facility: CLINIC | Age: 73
End: 2023-11-02
Payer: MEDICARE

## 2023-11-02 VITALS
TEMPERATURE: 98 F | OXYGEN SATURATION: 97 % | RESPIRATION RATE: 16 BRPM | HEART RATE: 88 BPM | SYSTOLIC BLOOD PRESSURE: 150 MMHG | DIASTOLIC BLOOD PRESSURE: 70 MMHG | HEIGHT: 66 IN | WEIGHT: 245 LBS | BODY MASS INDEX: 39.37 KG/M2

## 2023-11-02 DIAGNOSIS — I73.9 PERIPHERAL VASCULAR DISEASE, UNSPECIFIED: ICD-10-CM

## 2023-11-02 DIAGNOSIS — E11.9 TYPE 2 DIABETES MELLITUS W/OUT COMPLICATIONS: ICD-10-CM

## 2023-11-02 DIAGNOSIS — G47.30 SLEEP APNEA, UNSPECIFIED: ICD-10-CM

## 2023-11-02 DIAGNOSIS — R94.31 ABNORMAL ELECTROCARDIOGRAM [ECG] [EKG]: ICD-10-CM

## 2023-11-02 PROCEDURE — 99215 OFFICE O/P EST HI 40 MIN: CPT

## 2023-11-04 VITALS — SYSTOLIC BLOOD PRESSURE: 142 MMHG | DIASTOLIC BLOOD PRESSURE: 68 MMHG

## 2023-11-04 PROBLEM — R94.31 ABNORMAL ELECTROCARDIOGRAM: Status: ACTIVE | Noted: 2022-11-17

## 2023-11-04 PROBLEM — I73.9 PERIPHERAL ARTERIAL DISEASE: Status: ACTIVE | Noted: 2023-11-04

## 2023-11-04 PROBLEM — E11.9 DM2 (DIABETES MELLITUS, TYPE 2): Status: ACTIVE | Noted: 2022-11-17

## 2023-11-04 PROBLEM — G47.30 SLEEP APNEA, UNSPECIFIED TYPE: Status: ACTIVE | Noted: 2022-11-17

## 2023-11-07 RX ORDER — NITROFURANTOIN (MONOHYDRATE/MACROCRYSTALS) 25; 75 MG/1; MG/1
100 CAPSULE ORAL
Qty: 20 | Refills: 0 | Status: DISCONTINUED | COMMUNITY
Start: 2023-06-22 | End: 2023-11-07

## 2023-11-07 RX ORDER — GLIPIZIDE 5 MG/1
5 TABLET, FILM COATED, EXTENDED RELEASE ORAL
Qty: 90 | Refills: 0 | Status: DISCONTINUED | COMMUNITY
Start: 2023-10-29 | End: 2023-11-07

## 2023-11-07 RX ORDER — BERBERINE CHLOR/SEAWEED/CHROM 500-250 MG
CAPSULE ORAL
Refills: 0 | Status: ACTIVE | COMMUNITY

## 2024-05-06 ENCOUNTER — APPOINTMENT (OUTPATIENT)
Dept: HEART AND VASCULAR | Facility: CLINIC | Age: 74
End: 2024-05-06
Payer: MEDICARE

## 2024-05-06 ENCOUNTER — NON-APPOINTMENT (OUTPATIENT)
Age: 74
End: 2024-05-06

## 2024-05-06 VITALS
HEIGHT: 66 IN | RESPIRATION RATE: 16 BRPM | TEMPERATURE: 97.7 F | OXYGEN SATURATION: 97 % | HEART RATE: 86 BPM | WEIGHT: 250 LBS | BODY MASS INDEX: 40.18 KG/M2

## 2024-05-06 VITALS — SYSTOLIC BLOOD PRESSURE: 150 MMHG | DIASTOLIC BLOOD PRESSURE: 75 MMHG

## 2024-05-06 DIAGNOSIS — I51.7 CARDIOMEGALY: ICD-10-CM

## 2024-05-06 DIAGNOSIS — I25.10 ATHEROSCLEROTIC HEART DISEASE OF NATIVE CORONARY ARTERY W/OUT ANGINA PECTORIS: ICD-10-CM

## 2024-05-06 DIAGNOSIS — E78.00 PURE HYPERCHOLESTEROLEMIA, UNSPECIFIED: ICD-10-CM

## 2024-05-06 PROCEDURE — 93000 ELECTROCARDIOGRAM COMPLETE: CPT

## 2024-05-06 PROCEDURE — 99215 OFFICE O/P EST HI 40 MIN: CPT

## 2024-05-06 PROCEDURE — G2211 COMPLEX E/M VISIT ADD ON: CPT

## 2024-05-06 RX ORDER — PSYLLIUM HUSK 0.4 G
CAPSULE ORAL
Refills: 0 | Status: ACTIVE | COMMUNITY

## 2024-05-06 RX ORDER — MAGNESIUM OXIDE/MAG AA CHELATE 300 MG
CAPSULE ORAL
Refills: 0 | Status: ACTIVE | COMMUNITY

## 2024-05-06 RX ORDER — FLUCONAZOLE 100 MG/1
100 TABLET ORAL
Qty: 14 | Refills: 0 | Status: DISCONTINUED | COMMUNITY
Start: 2023-06-22 | End: 2024-05-06

## 2024-05-06 NOTE — DISCUSSION/SUMMARY
[FreeTextEntry1] : stable exam/ chart reviewed CAD stable, non obstructive, cath 3/23 carotid stenosis - stable, s/p R CEA 5/23 Lipids stable on statin LVH stable [EKG obtained to assist in diagnosis and management of assessed problem(s)] : EKG obtained to assist in diagnosis and management of assessed problem(s)

## 2024-05-06 NOTE — REASON FOR VISIT
[Structural Heart and Valve Disease] : structural heart and valve disease [Hyperlipidemia] : hyperlipidemia [Coronary Artery Disease] : coronary artery disease [Carotid, Aortic and Peripheral Vascular Disease] : carotid, aortic and peripheral vascular disease

## 2024-12-03 ENCOUNTER — NON-APPOINTMENT (OUTPATIENT)
Age: 74
End: 2024-12-03

## 2024-12-03 ENCOUNTER — APPOINTMENT (OUTPATIENT)
Dept: HEART AND VASCULAR | Facility: CLINIC | Age: 74
End: 2024-12-03
Payer: MEDICARE

## 2024-12-03 VITALS
WEIGHT: 256 LBS | DIASTOLIC BLOOD PRESSURE: 74 MMHG | RESPIRATION RATE: 17 BRPM | HEART RATE: 96 BPM | HEIGHT: 66 IN | BODY MASS INDEX: 41.14 KG/M2 | OXYGEN SATURATION: 98 % | SYSTOLIC BLOOD PRESSURE: 160 MMHG

## 2024-12-03 DIAGNOSIS — I25.10 ATHEROSCLEROTIC HEART DISEASE OF NATIVE CORONARY ARTERY W/OUT ANGINA PECTORIS: ICD-10-CM

## 2024-12-03 DIAGNOSIS — E78.00 PURE HYPERCHOLESTEROLEMIA, UNSPECIFIED: ICD-10-CM

## 2024-12-03 DIAGNOSIS — I51.7 CARDIOMEGALY: ICD-10-CM

## 2024-12-03 DIAGNOSIS — I65.21 OCCLUSION AND STENOSIS OF RIGHT CAROTID ARTERY: ICD-10-CM

## 2024-12-03 PROCEDURE — 93000 ELECTROCARDIOGRAM COMPLETE: CPT

## 2024-12-03 PROCEDURE — 99214 OFFICE O/P EST MOD 30 MIN: CPT

## 2024-12-03 PROCEDURE — G2211 COMPLEX E/M VISIT ADD ON: CPT

## 2025-06-03 ENCOUNTER — NON-APPOINTMENT (OUTPATIENT)
Age: 75
End: 2025-06-03

## 2025-06-03 ENCOUNTER — APPOINTMENT (OUTPATIENT)
Dept: HEART AND VASCULAR | Facility: CLINIC | Age: 75
End: 2025-06-03
Payer: MEDICARE

## 2025-06-03 VITALS
WEIGHT: 250 LBS | HEART RATE: 90 BPM | OXYGEN SATURATION: 97 % | RESPIRATION RATE: 16 BRPM | SYSTOLIC BLOOD PRESSURE: 130 MMHG | BODY MASS INDEX: 40.35 KG/M2 | DIASTOLIC BLOOD PRESSURE: 67 MMHG

## 2025-06-03 DIAGNOSIS — I51.7 CARDIOMEGALY: ICD-10-CM

## 2025-06-03 DIAGNOSIS — I25.10 ATHEROSCLEROTIC HEART DISEASE OF NATIVE CORONARY ARTERY W/OUT ANGINA PECTORIS: ICD-10-CM

## 2025-06-03 DIAGNOSIS — E78.00 PURE HYPERCHOLESTEROLEMIA, UNSPECIFIED: ICD-10-CM

## 2025-06-03 DIAGNOSIS — I65.21 OCCLUSION AND STENOSIS OF RIGHT CAROTID ARTERY: ICD-10-CM

## 2025-06-03 PROCEDURE — 93000 ELECTROCARDIOGRAM COMPLETE: CPT

## 2025-06-03 PROCEDURE — 99214 OFFICE O/P EST MOD 30 MIN: CPT

## 2025-06-03 PROCEDURE — G2211 COMPLEX E/M VISIT ADD ON: CPT

## 2025-07-18 ENCOUNTER — APPOINTMENT (OUTPATIENT)
Dept: HEART AND VASCULAR | Facility: CLINIC | Age: 75
End: 2025-07-18
Payer: MEDICARE

## 2025-07-18 ENCOUNTER — APPOINTMENT (OUTPATIENT)
Dept: VASCULAR SURGERY | Facility: CLINIC | Age: 75
End: 2025-07-18
Payer: MEDICARE

## 2025-07-18 VITALS
WEIGHT: 250 LBS | DIASTOLIC BLOOD PRESSURE: 80 MMHG | SYSTOLIC BLOOD PRESSURE: 142 MMHG | HEIGHT: 66 IN | BODY MASS INDEX: 40.18 KG/M2

## 2025-07-18 PROCEDURE — 99214 OFFICE O/P EST MOD 30 MIN: CPT

## 2025-07-18 PROCEDURE — 93880 EXTRACRANIAL BILAT STUDY: CPT
